# Patient Record
Sex: MALE | Race: WHITE | URBAN - METROPOLITAN AREA
[De-identification: names, ages, dates, MRNs, and addresses within clinical notes are randomized per-mention and may not be internally consistent; named-entity substitution may affect disease eponyms.]

---

## 2018-07-09 ENCOUNTER — HOSPITAL ENCOUNTER (OUTPATIENT)
Dept: SURGERY | Age: 58
Discharge: HOME | End: 2018-07-09
Attending: PODIATRIST | Admitting: PODIATRIST

## 2018-07-09 NOTE — ANESTHESIA PRE-OP
Department of Anesthesiology  Preprocedure Note       Name:  Brenda Ahn   Age:  66 y.o.  :  1960                                          MRN:  [id number]         Date:  2018      Surgeon:    Procedure:    Medications prior to admission:   Prior to Admission medications    Medication Sig Start Date End Date Taking? Authorizing Provider   atorvastatin (LIPITOR) 40 MG tablet Take 40 mg by mouth daily    Historical Provider, MD   gabapentin (NEURONTIN) 300 MG capsule Take 300 mg by mouth 3 times daily. Historical Provider, MD   metformin (GLUCOPHAGE) 500 MG tablet Take 1,000 mg by mouth 2 times daily (with meals). Historical Provider, MD   carvedilol (COREG) 25 MG tablet Take 25 mg by mouth 2 times daily (with meals). Historical Provider, MD   tizanidine (ZANAFLEX) 4 MG tablet Take 4 mg by mouth every 6 hours as needed. Historical Provider, MD       Current medications:    Current Outpatient Prescriptions   Medication Sig Dispense Refill    atorvastatin (LIPITOR) 40 MG tablet Take 40 mg by mouth daily      gabapentin (NEURONTIN) 300 MG capsule Take 300 mg by mouth 3 times daily.  metformin (GLUCOPHAGE) 500 MG tablet Take 1,000 mg by mouth 2 times daily (with meals).  carvedilol (COREG) 25 MG tablet Take 25 mg by mouth 2 times daily (with meals).  tizanidine (ZANAFLEX) 4 MG tablet Take 4 mg by mouth every 6 hours as needed. Current Facility-Administered Medications   Medication Dose Route Frequency Provider Last Rate Last Dose    ceFAZolin (ANCEF) 3 g in dextrose 5 % 100 mL IVPB  3 g Intravenous On Call to [address] [facility]           Allergies: Allergies   Allergen Reactions    Adhesive Tape      And plastic  Tape rash       Problem List:  There is no problem list on file for this patient.       Past Medical History:        Diagnosis Date    Diabetes mellitus ([facility] [address]     Hyperlipidemia     Hypertension        Past Surgical History:        Procedure Laterality Date    FOOT SURGERY      right 5th toe ampitation partial on 3rd toe    OTHER SURGICAL HISTORY      removal of object from anal cavity    ROTATOR CUFF REPAIR      right shoulder       Social History:    Social History   Substance Use Topics    Smoking status: Never Smoker    Smokeless tobacco: Never Used    Alcohol use 6.0 oz/week     12 drink(s) per week      Comment: occ                                Counseling given: Not Answered      Vital Signs (Current): There were no vitals filed for this visit. BP Readings from Last 3 Encounters:   07/18/10 123/83       NPO Status:                                                                                 BMI:   Wt Readings from Last 3 Encounters:   06/27/18 270 lb (122.5 kg)   07/18/10 270 lb (122.5 kg)     There is no height or weight on file to calculate BMI. Anesthesia Evaluation  Patient summary reviewed and Nursing notes reviewed  Airway:         Dental:          Pulmonary:                              Cardiovascular:    (+) hypertension:,                   Neuro/Psych:               GI/Hepatic/Renal:             Endo/Other:    (+) DiabetesType II DM, , .                 Abdominal:           Vascular:                                        Anesthesia Plan      MAC     ASA 2       Induction: intravenous. Anesthetic plan and risks discussed with patient. Plan discussed with CRNA.                   Kevin Faye MD   7/9/2018

## 2018-07-16 ENCOUNTER — HOSPITAL ENCOUNTER (OUTPATIENT)
Dept: SURGERY | Age: 58
Discharge: HOME | End: 2018-07-16
Attending: PODIATRIST | Admitting: PODIATRIST

## 2018-07-16 VITALS
SYSTOLIC BLOOD PRESSURE: 130 MMHG | WEIGHT: 257.9 LBS | HEIGHT: 74 IN | OXYGEN SATURATION: 97 % | BODY MASS INDEX: 33.1 KG/M2 | DIASTOLIC BLOOD PRESSURE: 87 MMHG | TEMPERATURE: 98 F | HEART RATE: 91 BPM | RESPIRATION RATE: 15 BRPM

## 2018-07-16 DIAGNOSIS — G89.18 POST-OP PAIN: Primary | ICD-10-CM

## 2018-07-16 LAB
ANION GAP SERPL CALCULATED.3IONS-SCNC: 12 MMOL/L (ref 3–16)
BUN BLDV-MCNC: 13 MG/DL (ref 7–20)
CALCIUM SERPL-MCNC: 9 MG/DL (ref 8.3–10.6)
CHLORIDE BLD-SCNC: 103 MMOL/L (ref 99–110)
CO2: 25 MMOL/L (ref 21–32)
CREAT SERPL-MCNC: 1 MG/DL (ref 0.9–1.3)
GFR AFRICAN AMERICAN: >60
GFR NON-AFRICAN AMERICAN: >60
GLUCOSE BLD-MCNC: 109 MG/DL (ref 70–99)
GLUCOSE BLD-MCNC: 120 MG/DL (ref 70–99)
GLUCOSE BLD-MCNC: 132 MG/DL (ref 70–99)
HCT VFR BLD CALC: 43.5 % (ref 40.5–52.5)
HEMOGLOBIN: 14.6 G/DL (ref 13.5–17.5)
MCH RBC QN AUTO: 27.4 PG (ref 26–34)
MCHC RBC AUTO-ENTMCNC: 33.5 G/DL (ref 31–36)
MCV RBC AUTO: 81.7 FL (ref 80–100)
PDW BLD-RTO: 13.3 % (ref 12.4–15.4)
PERFORMED ON: ABNORMAL
PERFORMED ON: ABNORMAL
PLATELET # BLD: 187 K/UL (ref 135–450)
PMV BLD AUTO: 9.5 FL (ref 5–10.5)
POTASSIUM SERPL-SCNC: 3.8 MMOL/L (ref 3.5–5.1)
RBC # BLD: 5.32 M/UL (ref 4.2–5.9)
SODIUM BLD-SCNC: 140 MMOL/L (ref 136–145)
WBC # BLD: 8.9 K/UL (ref 4–11)

## 2018-07-16 RX ORDER — HYDROCODONE BITARTRATE AND ACETAMINOPHEN 5; 325 MG/1; MG/1
1 TABLET ORAL EVERY 6 HOURS PRN
Qty: 20 TABLET | Refills: 0 | Status: SHIPPED | OUTPATIENT
Start: 2018-07-16 | End: 2018-07-21

## 2018-07-16 RX ORDER — INSULIN GLARGINE 100 [IU]/ML
50 INJECTION, SOLUTION SUBCUTANEOUS NIGHTLY
COMMUNITY

## 2018-07-16 RX ORDER — SODIUM CHLORIDE 0.9 % (FLUSH) 0.9 %
10 SYRINGE (ML) INJECTION EVERY 12 HOURS SCHEDULED
Status: DISCONTINUED | OUTPATIENT
Start: 2018-07-16 | End: 2018-07-17 | Stop reason: HOSPADM

## 2018-07-16 RX ORDER — HYDROMORPHONE HCL 110MG/55ML
0.5 PATIENT CONTROLLED ANALGESIA SYRINGE INTRAVENOUS EVERY 5 MIN PRN
Status: DISCONTINUED | OUTPATIENT
Start: 2018-07-16 | End: 2018-07-17 | Stop reason: HOSPADM

## 2018-07-16 RX ORDER — MEPERIDINE HYDROCHLORIDE 25 MG/ML
12.5 INJECTION INTRAMUSCULAR; INTRAVENOUS; SUBCUTANEOUS EVERY 5 MIN PRN
Status: DISCONTINUED | OUTPATIENT
Start: 2018-07-16 | End: 2018-07-17 | Stop reason: HOSPADM

## 2018-07-16 RX ORDER — OXYCODONE HYDROCHLORIDE AND ACETAMINOPHEN 5; 325 MG/1; MG/1
1 TABLET ORAL
Status: ACTIVE | OUTPATIENT
Start: 2018-07-16 | End: 2018-07-16

## 2018-07-16 RX ORDER — HYDRALAZINE HYDROCHLORIDE 20 MG/ML
5 INJECTION INTRAMUSCULAR; INTRAVENOUS EVERY 10 MIN PRN
Status: DISCONTINUED | OUTPATIENT
Start: 2018-07-16 | End: 2018-07-17 | Stop reason: HOSPADM

## 2018-07-16 RX ORDER — SODIUM CHLORIDE 0.9 % (FLUSH) 0.9 %
10 SYRINGE (ML) INJECTION PRN
Status: DISCONTINUED | OUTPATIENT
Start: 2018-07-16 | End: 2018-07-17 | Stop reason: HOSPADM

## 2018-07-16 RX ORDER — SODIUM CHLORIDE 9 MG/ML
INJECTION, SOLUTION INTRAVENOUS CONTINUOUS
Status: DISCONTINUED | OUTPATIENT
Start: 2018-07-16 | End: 2018-07-17 | Stop reason: HOSPADM

## 2018-07-16 RX ORDER — LIDOCAINE HYDROCHLORIDE 10 MG/ML
1 INJECTION, SOLUTION EPIDURAL; INFILTRATION; INTRACAUDAL; PERINEURAL
Status: ACTIVE | OUTPATIENT
Start: 2018-07-16 | End: 2018-07-16

## 2018-07-16 RX ORDER — SODIUM CHLORIDE, SODIUM LACTATE, POTASSIUM CHLORIDE, CALCIUM CHLORIDE 600; 310; 30; 20 MG/100ML; MG/100ML; MG/100ML; MG/100ML
INJECTION, SOLUTION INTRAVENOUS CONTINUOUS
Status: DISCONTINUED | OUTPATIENT
Start: 2018-07-16 | End: 2018-07-17 | Stop reason: HOSPADM

## 2018-07-16 RX ORDER — ONDANSETRON 2 MG/ML
4 INJECTION INTRAMUSCULAR; INTRAVENOUS
Status: ACTIVE | OUTPATIENT
Start: 2018-07-16 | End: 2018-07-16

## 2018-07-16 RX ORDER — CEFAZOLIN SODIUM 2 G/100ML
2 INJECTION, SOLUTION INTRAVENOUS ONCE
Status: COMPLETED | OUTPATIENT
Start: 2018-07-16 | End: 2018-07-16

## 2018-07-16 RX ORDER — LABETALOL HYDROCHLORIDE 5 MG/ML
5 INJECTION, SOLUTION INTRAVENOUS EVERY 10 MIN PRN
Status: DISCONTINUED | OUTPATIENT
Start: 2018-07-16 | End: 2018-07-17 | Stop reason: HOSPADM

## 2018-07-16 RX ADMIN — CEFAZOLIN SODIUM 2 G: 2 INJECTION, SOLUTION INTRAVENOUS at 10:27

## 2018-07-16 RX ADMIN — SODIUM CHLORIDE: 9 INJECTION, SOLUTION INTRAVENOUS at 09:15

## 2018-07-16 ASSESSMENT — PAIN - FUNCTIONAL ASSESSMENT: PAIN_FUNCTIONAL_ASSESSMENT: 0-10

## 2018-07-16 NOTE — H&P
CALCIUM, GFRAA, LABGLOM, GLUCOSE    Problem List  Active Problems:    * No active hospital problems. *  Resolved Problems:    * No resolved hospital problems. *        Assessment & Recommendation:     1. Non healing Ulcer L foot-Patient is medically optimized for surgery. 2. Dm 2-uncontrolled. On insulin, victoza, metformin. 9.7. Follow by PCP. 3. Hypertension-stable. Thank you for the opportunity to participate in the care of your patient.   Fab Michelle PA-C    7/16/2018 9:17 AM

## 2018-07-16 NOTE — ANESTHESIA POST-OP
Anesthesia Post-op Note    Patient: Makayla Galloway  MRN: [id number]  YOB: 1960  Date of evaluation: 7/16/2018  Time:  12:35 PM     Procedure(s) Performed:     Last Vitals: /87   Pulse 91   Temp 98 °F (36.7 °C) (Temporal)   Resp 15   Ht 6' 2\" (1.88 m)   Wt 257 lb 14.4 oz (117 kg)   SpO2 97%   BMI 33.11 kg/m²     Xi Phase I: Xi Score: 10    Xi Phase II:      Anesthesia Post Evaluation    Final anesthesia type: MAC  Patient location during evaluation: PACU  Patient participation: complete - patient participated  Level of consciousness: awake and alert  Airway patency: patent  Nausea & Vomiting: no nausea and no vomiting  Complications: no  Cardiovascular status: hemodynamically stable  Respiratory status: acceptable  Hydration status: stable        Addy Hickman MD  12:35 PM

## 2018-07-16 NOTE — ANESTHESIA PRE-OP
(Current):   Vitals:    07/16/18 0840 07/16/18 0852   BP:  127/86   Pulse:  100   Resp:  14   Temp:  98.7 °F (37.1 °C)   TempSrc:  Temporal   SpO2:  96%   Weight: 257 lb 14.4 oz (117 kg)    Height: 6' 2\" (1.88 m)                                               BP Readings from Last 3 Encounters:   07/16/18 127/86   07/18/10 123/83       NPO Status: Time of last liquid consumption: 2330                        Time of last solid consumption: 2330                        Date of last liquid consumption: 07/15/18                        Date of last solid food consumption: 07/15/18    BMI:   Wt Readings from Last 3 Encounters:   07/16/18 257 lb 14.4 oz (117 kg)   06/27/18 270 lb (122.5 kg)   07/18/10 270 lb (122.5 kg)     Body mass index is 33.11 kg/m². Anesthesia Evaluation  Patient summary reviewed and Nursing notes reviewed no history of anesthetic complications:   Airway: Mallampati: II  TM distance: >3 FB   Neck ROM: full  Mouth opening: > = 3 FB Dental:    (+) edentulous      Pulmonary:Negative Pulmonary ROS and normal exam  breath sounds clear to auscultation      (-) COPD, asthma and sleep apnea                           Cardiovascular:    (+) hypertension:, hyperlipidemia    (-) past MI, CAD, CABG/stent, dysrhythmias,  angina and  CHF      Rhythm: regular  Rate: normal           Beta Blocker:  Dose within 24 Hrs         Neuro/Psych:   Negative Neuro/Psych ROS     (-) seizures, TIA and CVA           GI/Hepatic/Renal: Neg GI/Hepatic/Renal ROS       (-) GERD, liver disease and no renal disease       Endo/Other:    (+) DiabetesType II DM, poorly controlled, using insulin, .    (-) hypothyroidism, hyperthyroidism               Abdominal:           Vascular:                                        Anesthesia Plan      MAC     ASA 3       Induction: intravenous. Anesthetic plan and risks discussed with patient. Plan discussed with CRNA.                   Brooklyn Bond MD   7/16/2018

## 2018-07-16 NOTE — BRIEF OP NOTE
Brief Postoperative Note    Claudia Clark  YOB: 1960  [id number]    Pre-operative Diagnosis: L chronic ulceration 2. Tailors bunion/Prominent L 5th met head    Post-operative Diagnosis: Same    Procedure: L 5th met head resection    Anesthesia: MAC    Surgeons/Assistants: Jojo Sharp     Estimated Blood Loss: less than 50     Complications: None    Specimens: Was Not Obtained    Findings: No pockets of purulence. Skin closed well without tension. Ulcer sufficiently offloaded.      Electronically signed by Cristopher Emery DPM on 7/16/2018 at 11:10 AM

## 2018-07-19 NOTE — OP NOTE
[address]                      [address] [facility], [address] [facility]                                 OPERATIVE REPORT    PATIENT NAME: Danyel Houser                 :        1960  MED REC NO:   [id number]                          ROOM:  ACCOUNT NO:   [de-identified]                          ADMIT DATE: 2018  PROVIDER:     Pravin Helm Dpm    DATE OF PROCEDURE:  2018    PREOPERATIVE DIAGNOSES:  1. Chronic ulceration left foot. 2.  May's bunion with prominent fifth metatarsal head left foot. POSTOPERATIVE DIAGNOSES:  1. Chronic ulceration left foot. 2.  May's bunion with prominent fifth metatarsal head left foot. OPERATION PERFORMED:  Left fifth metatarsal head resection. SURGEON:  Pravin Helm DPM    ANESTHESIA:  MAC. HEMOSTASIS:  Ankle tourniquet set at 250 mmHg. INDICATIONS:  This 53-year-old male is known to me from the wound clinic  where he has been treated for a chronic ulceration to the plantar lateral  aspect of his left foot sub fifth metatarsal area. The ulceration has been  resistant to conservative measures and has not showed evidence of healing  due to the chronic pressure from the fifth metatarsal head that is enlarged  and May's bunion deformity. The patient was seen preoperatively. Procedure and postop course were discussed as well as alternatives, risks,  and complications. No guarantees were given. All questions were answered  to the patient's satisfaction. The patient agrees to comply and asked to  proceed with the operation. REPORT OF OPERATION:  The patient was brought into the operating room,  properly identified, and placed on the operating room table in the supine  position. Following administration of IV sedation, approximately 10 mL of  a 1:1 mix of 1% Xylocaine plain with 0.5% Marcaine plain was infiltrated  about the left fifth metatarsal in a Alvarado block type fashion.   The left  foot was then scrubbed, prepped, and draped in usual sterile and aseptic  manner, elevated for approximately 3 minutes and a previously applied well  padded ankle tourniquet was inflated to 250 mmHg. Attention was then  directed to the dorsal lateral aspect of the left fifth metatarsal where an  approximately 4 cm linear incision was made. Incision was deepened in the  same plane with care taken to identify and retract all vital neurovascular  structures. All superficial bleeders were cauterized or ligated as deemed  necessary. The incision was deepened to the level of the capsule and  periosteum of the fifth metatarsal where a linear capsular and periosteal  incision was made. The head and neck of the fifth metatarsal was freed  from all soft tissue attachments. Next, a sagittal saw was then used to  resect the head of the fifth metatarsal.  The resection was made at the  surgical neck of the fifth metatarsal.  The area was then flushed with  copious amounts of normal sterile saline, suctioned and found to be free of  all osseous debris. The plantar ulceration was then palpated and found to  be completely offloaded. Closure was undertaken in layers with deep tissue  being closed using 3-0 Vicryl, subcutaneous tissue was closed using 4-0  Vicryl and skin was closed using 4-0 nylon. The surgical site was then  dressed with Adaptic, 4x4s, Osvaldo and an Ace wrap. The patient tolerated  the procedure and anesthesia well and left the operating room with vital  signs stable and vascular status intact to all digits of the left foot. Following a period of postoperative monitoring, the patient will be  discharged home after being given both oral and written home going  instructions. He is to followup in my office within one week.         Juliette Moura DPM    D: 07/18/2018 20:51:23       T: 07/19/2018 5:07:55     [email or web address]  Job#: [id number]     Doc#: [id number]